# Patient Record
Sex: FEMALE | Race: WHITE
[De-identification: names, ages, dates, MRNs, and addresses within clinical notes are randomized per-mention and may not be internally consistent; named-entity substitution may affect disease eponyms.]

---

## 2018-11-23 ENCOUNTER — HOSPITAL ENCOUNTER (EMERGENCY)
Dept: HOSPITAL 63 - ER | Age: 23
Discharge: HOME | End: 2018-11-23
Payer: COMMERCIAL

## 2018-11-23 VITALS — DIASTOLIC BLOOD PRESSURE: 82 MMHG | SYSTOLIC BLOOD PRESSURE: 116 MMHG

## 2018-11-23 VITALS — WEIGHT: 138 LBS | HEIGHT: 68 IN | BODY MASS INDEX: 20.92 KG/M2

## 2018-11-23 DIAGNOSIS — M79.604: Primary | ICD-10-CM

## 2018-11-23 DIAGNOSIS — G47.00: ICD-10-CM

## 2018-11-23 LAB
ANION GAP SERPL CALC-SCNC: 10 MMOL/L (ref 6–14)
CA-I SERPL ISE-MCNC: 21 MG/DL (ref 7–20)
CALCIUM SERPL-MCNC: 8.9 MG/DL (ref 8.5–10.1)
CHLORIDE SERPL-SCNC: 102 MMOL/L (ref 98–107)
CO2 SERPL-SCNC: 27 MMOL/L (ref 21–32)
CREAT SERPL-MCNC: 1 MG/DL (ref 0.6–1)
GFR SERPLBLD BASED ON 1.73 SQ M-ARVRAT: 70 ML/MIN
GLUCOSE SERPL-MCNC: 84 MG/DL (ref 70–99)
POTASSIUM SERPL-SCNC: 3.7 MMOL/L (ref 3.5–5.1)
SODIUM SERPL-SCNC: 139 MMOL/L (ref 136–145)

## 2018-11-23 PROCEDURE — 85379 FIBRIN DEGRADATION QUANT: CPT

## 2018-11-23 PROCEDURE — 36415 COLL VENOUS BLD VENIPUNCTURE: CPT

## 2018-11-23 PROCEDURE — 80048 BASIC METABOLIC PNL TOTAL CA: CPT

## 2018-11-23 PROCEDURE — 99284 EMERGENCY DEPT VISIT MOD MDM: CPT

## 2018-11-23 PROCEDURE — 93971 EXTREMITY STUDY: CPT

## 2018-11-23 NOTE — PHYS DOC
Adult General


Chief Complaint


Chief Complaint


leg pain





HPI


HPI


spot on the right leg for the past 48 hours. As the same for the past 4 hours 

insomnia in one spot 4 x 4 centimeter she called the hotline and the nurse 

asked her to come to the emergency department to rule out DVT





Review of Systems


Review of Systems





Constitutional: Denies fever or chills []


Eyes: Denies change in visual acuity, redness, or eye pain []


HENT: Denies nasal congestion or sore throat []


Respiratory: Denies cough or shortness of breath []


Cardiovascular: No additional information not addressed in HPI []


GI: Denies abdominal pain, nausea, vomiting, bloody stools or diarrhea []


: Denies dysuria or hematuria []


Musculoskeletal: Denies back pain or joint pain []


Integument: Denies rash or skin lesions []


Neurologic: Denies headache, focal weakness or sensory changes []


Endocrine: Denies polyuria or polydipsia []





All other systems were reviewed and found to be within normal limits, except as 

documented in this note.





Allergies


Allergies





Allergies








Coded Allergies Type Severity Reaction Last Updated Verified


 


  No Known Drug Allergies    11/23/18 No











Physical Exam


Physical Exam





Constitutional: Well developed, well nourished, no acute distress, non-toxic 

appearance. []


HENT: Normocephalic, atraumatic, bilateral external ears normal, oropharynx 

moist, no oral exudates, nose normal. []


Eyes: PERRLA, EOMI, conjunctiva normal, no discharge. [] 


Neck: Normal range of motion, no tenderness, supple, no stridor. [] 


Cardiovascular:Heart rate regular rhythm, no murmur []


Lungs & Thorax:  Bilateral breath sounds clear to auscultation []


Abdomen: Bowel sounds normal, soft, no tenderness, no masses, no pulsatile 

masses. [] 


Skin: Warm, dry, no erythema, no rash. [] 


Back: No tenderness, no CVA tenderness. [] 


Extremities: 2.2 cm tenderness in the right calf, no cyanosis, no clubbing, ROM 

intact, no edema. [] 


Neurologic: Alert and oriented X 3, normal motor function, normal sensory 

function, no focal deficits noted. []


Psychologic: Affect normal, judgement normal, mood normal. []





Current Patient Data


Vital Signs





 Vital Signs








  Date Time  Temp Pulse Resp B/P (MAP) Pulse Ox O2 Delivery O2 Flow Rate FiO2


 


11/23/18 02:20 97.6    96 Room Air  








Lab Results





 Laboratory Tests








Test


 11/23/18


03:20


 


D-Dimer (Jayla)


 1.00 mg/L


(0.00-0.50)  H


 


Sodium Level


 139 mmol/L


(136-145)


 


Potassium Level


 3.7 mmol/L


(3.5-5.1)


 


Chloride Level


 102 mmol/L


()


 


Carbon Dioxide Level


 27 mmol/L


(21-32)


 


Anion Gap 10 (6-14)  


 


Blood Urea Nitrogen


 21 mg/dL


(7-20)  H


 


Creatinine


 1.0 mg/dL


(0.6-1.0)


 


Estimated GFR


(Cockcroft-Gault) 70.0  





 


Glucose Level


 84 mg/dL


(70-99)


 


Calcium Level


 8.9 mg/dL


(8.5-10.1)











EKG


EKG


[]





Radiology/Procedures


Radiology/Procedures


[]





Course & Med Decision Making


Course & Med Decision Making


Pertinent Labs and Imaging studies reviewed. (See chart for details)





[]





Final Impression


Final Impression


[]


Problems:  


(1) Leg pain


Qualifiers:  


   Qualified Codes:  M79.604 - Pain in right leg





Dragon Disclaimer


Dragon Disclaimer


This electronic medical record was generated, in whole or in part, using a 

voice recognition dictation system.











KARYN LOBO MD Nov 23, 2018 03:16

## 2018-11-23 NOTE — RAD
RIGHT LOWER EXTREMITY ULTRASOUND WITH DOPPLER 11/23/2018 5:12 AM

 

Clinical Information: Lateral mid calf pain.

 

Comparison: None.

 

Technique: Multiple grayscale, color Doppler, and spectral Doppler 

sonographic images of the lower extremity venous structures were obtained.

 

Findings:

The right common femoral, femoral, and popliteal veins exhibit normal 

compression, respiratory phasicity, and augmentation. No intraluminal 

thrombi are identified. Color Doppler flow is demonstrated in the right 

posterior tibial and peroneal veins. No significant abnormality identified

in the region of interest.

 

Greater saphenous vein is patent.

 

Impression:

1. No evidence of deep venous thrombosis.

 

Electronically signed by: Julia Conner MD (11/23/2018 5:42 AM) 

[id number]